# Patient Record
Sex: MALE | Race: WHITE | NOT HISPANIC OR LATINO
[De-identification: names, ages, dates, MRNs, and addresses within clinical notes are randomized per-mention and may not be internally consistent; named-entity substitution may affect disease eponyms.]

---

## 2018-11-12 ENCOUNTER — APPOINTMENT (OUTPATIENT)
Dept: COLORECTAL SURGERY | Facility: CLINIC | Age: 48
End: 2018-11-12
Payer: COMMERCIAL

## 2018-11-12 VITALS
TEMPERATURE: 98 F | SYSTOLIC BLOOD PRESSURE: 126 MMHG | BODY MASS INDEX: 27.11 KG/M2 | WEIGHT: 183 LBS | HEIGHT: 69 IN | DIASTOLIC BLOOD PRESSURE: 73 MMHG | HEART RATE: 65 BPM

## 2018-11-12 DIAGNOSIS — Z78.9 OTHER SPECIFIED HEALTH STATUS: ICD-10-CM

## 2018-11-12 DIAGNOSIS — Z83.71 FAMILY HISTORY OF COLONIC POLYPS: ICD-10-CM

## 2018-11-12 DIAGNOSIS — A74.9 CHLAMYDIAL INFECTION, UNSPECIFIED: ICD-10-CM

## 2018-11-12 DIAGNOSIS — Z86.19 PERSONAL HISTORY OF OTHER INFECTIOUS AND PARASITIC DISEASES: ICD-10-CM

## 2018-11-12 DIAGNOSIS — J45.909 UNSPECIFIED ASTHMA, UNCOMPLICATED: ICD-10-CM

## 2018-11-12 PROCEDURE — 46922 EXCISION OF ANAL LESION(S): CPT

## 2018-11-12 PROCEDURE — 99202 OFFICE O/P NEW SF 15 MIN: CPT | Mod: 25

## 2018-11-12 RX ORDER — BUDESONIDE AND FORMOTEROL FUMARATE DIHYDRATE 160; 4.5 UG/1; UG/1
160-4.5 AEROSOL RESPIRATORY (INHALATION)
Refills: 0 | Status: ACTIVE | COMMUNITY

## 2018-11-12 RX ORDER — EMTRICITABINE AND TENOFOVIR DISOPROXIL FUMARATE 100; 150 MG/1; MG/1
100-150 TABLET, FILM COATED ORAL
Refills: 0 | Status: ACTIVE | COMMUNITY

## 2019-05-29 ENCOUNTER — APPOINTMENT (OUTPATIENT)
Dept: COLORECTAL SURGERY | Facility: CLINIC | Age: 49
End: 2019-05-29
Payer: COMMERCIAL

## 2019-05-29 VITALS
DIASTOLIC BLOOD PRESSURE: 79 MMHG | WEIGHT: 182 LBS | HEART RATE: 76 BPM | HEIGHT: 69 IN | BODY MASS INDEX: 26.96 KG/M2 | SYSTOLIC BLOOD PRESSURE: 139 MMHG | TEMPERATURE: 97.8 F

## 2019-05-29 DIAGNOSIS — K60.2 ANAL FISSURE, UNSPECIFIED: ICD-10-CM

## 2019-05-29 DIAGNOSIS — Z00.00 ENCOUNTER FOR GENERAL ADULT MEDICAL EXAMINATION W/OUT ABNORMAL FINDINGS: ICD-10-CM

## 2019-05-29 PROCEDURE — 99213 OFFICE O/P EST LOW 20 MIN: CPT | Mod: 25

## 2019-05-29 PROCEDURE — 46600 DIAGNOSTIC ANOSCOPY SPX: CPT

## 2019-05-30 NOTE — ASSESSMENT
[FreeTextEntry1] : Exam findings and diagnosis were discussed at length with patient. 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

## 2019-05-30 NOTE — CONSULT LETTER
[Dear  ___] : Dear  [unfilled], [Consult Letter:] : I had the pleasure of evaluating your patient, [unfilled]. [( Thank you for referring [unfilled] for consultation for _____ )] : Thank you for referring [unfilled] for consultation for [unfilled] [Please see my note below.] : Please see my note below. [Consult Closing:] : Thank you very much for allowing me to participate in the care of this patient.  If you have any questions, please do not hesitate to contact me. [Sincerely,] : Sincerely, [FreeTextEntry2] : GEORGE CUEVA\par [address]\[place] [address] [FreeTextEntry3] : NHAN GAVIRIA MD

## 2019-05-30 NOTE — PHYSICAL EXAM
[FreeTextEntry1] : Medical assistant present for duration of physical examination\par \par Gen NAD, AOx3\par \par Anorectal Exam:\par Inspection anterior midline fissure, reactive small edematous tag that patient confirms is the area of concern prompting evaluation today\par STI swabs performed\par PEARL mildlly tender, no masses palpated, no blood on gloved finger, elevated tone\par Anoscopy anterior midline fissure, mild internal hemorrhoids\par \par

## 2019-05-30 NOTE — HISTORY OF PRESENT ILLNESS
[FreeTextEntry1] : 49 yo M presents for evaluation of possible wart\par \par Last seen by Dr. Mendez 11/2018, noted anal fissure and ext hemorrhoid and residual skin tags, s/p excision to left posterior\par \par Pt c/o burning after BMs x 1 month and admits to avoiding anal receptive sex due to discomfort\par Reports "bloody" looking tissue always out\par Occasional BPR w/ BMs noted on stool\par Denies itching or discharge\par Has not tried any OTC medications or creams\par Denies h/o anogenital warts\par \par Seen by Dr. Ontiveros (GI) 5/21/19 for c/o BM frequency, completed ?flex sigmoidoscopy in office, normal as per patient. Started on Canasa once nightly w/ improvement in internal irritation. Pt reports external symptoms were not addressed at this time.\par \par BH: 2-3 times daily, formed. Denies straining or complaints\par Reports adequate dietary fiber intake, drinks approx 5-6 cups water\par (+) anal receptive sex, HIV (-) on PrEP. Denies new sexual partners, reports monogamous relationship plus additional known partners\par \par Had a colonoscopy done 9/16 with no abnormal findings per patient, h/o colorectal cancer and polyps on maternal side of family\par No ASA  in last 7 days, took ibuprofen a few days ago\par Never received Gardasil vaccine \par

## 2019-05-31 DIAGNOSIS — A74.9 CHLAMYDIAL INFECTION, UNSPECIFIED: ICD-10-CM

## 2019-05-31 LAB
C TRACH RRNA SPEC QL NAA+PROBE: DETECTED
N GONORRHOEA RRNA SPEC QL NAA+PROBE: NOT DETECTED
SOURCE AMPLIFICATION: NORMAL

## 2019-06-03 RX ORDER — CEFTRIAXONE 250 MG/1
250 INJECTION, POWDER, FOR SOLUTION INTRAMUSCULAR; INTRAVENOUS
Qty: 1 | Refills: 0 | Status: COMPLETED | OUTPATIENT
Start: 2019-06-03

## 2019-06-06 LAB — HHV SPEC CULT: NORMAL

## 2020-09-03 ENCOUNTER — TRANSCRIPTION ENCOUNTER (OUTPATIENT)
Age: 50
End: 2020-09-03

## 2020-10-09 ENCOUNTER — TRANSCRIPTION ENCOUNTER (OUTPATIENT)
Age: 50
End: 2020-10-09

## 2021-01-14 ENCOUNTER — APPOINTMENT (OUTPATIENT)
Dept: COLORECTAL SURGERY | Facility: CLINIC | Age: 51
End: 2021-01-14
Payer: COMMERCIAL

## 2021-01-14 VITALS
TEMPERATURE: 97.3 F | BODY MASS INDEX: 28.58 KG/M2 | WEIGHT: 193 LBS | HEIGHT: 69 IN | HEART RATE: 66 BPM | SYSTOLIC BLOOD PRESSURE: 116 MMHG | DIASTOLIC BLOOD PRESSURE: 74 MMHG

## 2021-01-14 DIAGNOSIS — K64.8 OTHER HEMORRHOIDS: ICD-10-CM

## 2021-01-14 PROCEDURE — 46600 DIAGNOSTIC ANOSCOPY SPX: CPT

## 2021-01-14 PROCEDURE — 99072 ADDL SUPL MATRL&STAF TM PHE: CPT

## 2021-01-14 RX ORDER — FLUOROURACIL 50 MG/G
5 CREAM TOPICAL
Qty: 40 | Refills: 0 | Status: DISCONTINUED | COMMUNITY
Start: 2019-03-26 | End: 2021-01-14

## 2021-01-14 RX ORDER — AZITHROMYCIN 500 MG/1
500 TABLET, FILM COATED ORAL
Qty: 2 | Refills: 0 | Status: DISCONTINUED | COMMUNITY
Start: 2019-05-31 | End: 2021-01-14

## 2021-01-14 RX ORDER — HYDROCORTISONE 25 MG/G
2.5 CREAM TOPICAL
Qty: 1 | Refills: 2 | Status: ACTIVE | COMMUNITY
Start: 2021-01-14 | End: 1900-01-01

## 2021-01-14 RX ORDER — SINECATECHINS 150 MG/G
15 OINTMENT TOPICAL
Qty: 30 | Refills: 0 | Status: DISCONTINUED | COMMUNITY
Start: 2018-11-28 | End: 2021-01-14

## 2021-01-14 RX ORDER — MESALAMINE 1000 MG/1
1000 SUPPOSITORY RECTAL
Qty: 30 | Refills: 0 | Status: DISCONTINUED | COMMUNITY
Start: 2019-05-21 | End: 2021-01-14

## 2021-01-14 RX ORDER — EMTRICITABINE AND TENOFOVIR DISOPROXIL FUMARATE 200; 300 MG/1; MG/1
200-300 TABLET, FILM COATED ORAL
Qty: 30 | Refills: 0 | Status: DISCONTINUED | COMMUNITY
Start: 2018-02-28 | End: 2021-01-14

## 2021-01-14 NOTE — HISTORY OF PRESENT ILLNESS
[FreeTextEntry1] : 51 y/o M presents for f/u anal fissure\par Last seen in the office on 5/29/19.  Anterior midline fissure and reactive small edematous tag noted on exam. He was advised to begin using Diltiazem ointment TID. STD testing completed during last visit which was positive for Chlamydia. He was treated with 1 G Azithromycin and Ceftriaxone IM\par \par Reports intermittent recurrence of fissure since previous visit. Reports occasional discomfort and sensation that the are is "raw" after BM's or sexual activity that can last several weeks when it occurs\par Has applied Diltiazem ointment in the past fort 2-3 weeks at a time with temporary improvement in symptoms\par Denies pain, itching, bleeding currently, drainage, fever, chills\par BH:2-3 times daily\par Denies constipation, diarrhea or straining\par Admits to limited dietary fiber intake. Drinking 3 cups of water daily \par No use of stool softeners, fiber supplements or laxatives\par MSM, (+) anal receptive sex\par HIV (-), on PrEP\par Last colonoscopy completed 2016, no abnormal findings per patient. Flexible sigmoidoscopy completed with Dr. Ontiveros, GI, in the office on 5/21/19 for frequent BM's. Following study he was started on Canasa \par No ASA/NSAIDs last 7 days

## 2021-01-14 NOTE — PHYSICAL EXAM
[FreeTextEntry1] : Medical assistant present for duration of physical examination\par \par Gen no acute distress, alert and oriented\par Psych calm, pleasant demeanor, responding appropriately to questions\par Nonlabored breathing\par Ambulating without assistance\par Skin normal color and pigment, no visible lesions or rashes\par \par Abdomen\par \par Anorectal Exam:\par Inspection no erythema, induration or fluctuance, no skin excoriation, no fissure, no external hemorrhoids or anal tags\par PEARL nontender, no masses palpated, no blood on gloved finger\par \par Procedure: Anoscopy\par \par Pre procedure Diagnosis: perianal discomfort\par Post procedure Diagnosis: internal hemorrhoids\par Anesthesia: none\par Estimated blood loss: none\par Specimen: none\par Complications: none\par \par Consent obtained. Anoscopy was performed by passing a lighted anoscope with lubricant jelly into the anal canal and the entire anal mucosal surface was inspected. Findings included no fissure, mildly inflamed internal hemorrhoids, no visible masses or lesions\par \par Patient tolerated examination and procedure well.\par \par \par

## 2021-01-14 NOTE — ASSESSMENT
[FreeTextEntry1] : Exam findings and diagnosis were discussed at length with patient. \par \par Previously noted fissure has resolved.\par \par Internal hemorrhoids noted on anoscopy.\par \par Recommendations including increased fiber intake, adequate daily hydration, stool softeners as needed, and sitz baths as needed and after bowel movements were discussed.\par Medical management, such as calmol-4 suppositories and  hydrocortisone cream, was discussed.\par \par Continue supportive care.\par F/u if symptoms worsen despite medical management or if new symptoms arise.\par \par All questions answered, patient expressed understanding and is agreeable to this plan.\par

## 2022-08-16 ENCOUNTER — APPOINTMENT (OUTPATIENT)
Dept: RADIOLOGY | Facility: HOSPITAL | Age: 52
End: 2022-08-16

## 2023-01-04 ENCOUNTER — NON-APPOINTMENT (OUTPATIENT)
Age: 53
End: 2023-01-04

## 2023-01-26 ENCOUNTER — NON-APPOINTMENT (OUTPATIENT)
Age: 53
End: 2023-01-26

## 2023-01-26 ENCOUNTER — APPOINTMENT (OUTPATIENT)
Dept: COLORECTAL SURGERY | Facility: CLINIC | Age: 53
End: 2023-01-26
Payer: COMMERCIAL

## 2023-01-26 VITALS
SYSTOLIC BLOOD PRESSURE: 111 MMHG | HEIGHT: 69 IN | DIASTOLIC BLOOD PRESSURE: 68 MMHG | TEMPERATURE: 97.88 F | BODY MASS INDEX: 26.66 KG/M2 | WEIGHT: 180 LBS | HEART RATE: 75 BPM

## 2023-01-26 DIAGNOSIS — Z12.11 ENCOUNTER FOR SCREENING FOR MALIGNANT NEOPLASM OF COLON: ICD-10-CM

## 2023-01-26 DIAGNOSIS — Z01.818 ENCOUNTER FOR OTHER PREPROCEDURAL EXAMINATION: ICD-10-CM

## 2023-01-26 PROCEDURE — 99212 OFFICE O/P EST SF 10 MIN: CPT

## 2023-01-26 NOTE — HISTORY OF PRESENT ILLNESS
[FreeTextEntry1] : 51 y/o M presents for colonoscopy consultation, h/o anal fissure.\par \par MSM, (+) anal receptive sex\par HIV (-), on PrEP\par \par Last colonoscopy completed 2016, no abnormal findings per patient, unsure of Dr. name, was told to repeat in 5 years. Flexible sigmoidoscopy completed with Dr. Ontiveros GI, in the office on 5/21/19 for frequent BM's. Following study he was started on Canasa. \par [facility] CRC- Grandfather on Mothers side-unsure of age diagnosed. \par \par Last seen in the office 1/14/21, intermittent recurrence of fissure. Exam including anoscopy revealed, no fissure, mildly inflamed internal hemorrhoids, no visible masses or lesions. supportive measures reviewed including use of stool softeners, increase daily fiber intake and adequate hydration. \par \par Patient is here today for a follow up and to discuss colonoscopy. C/o intermittent bleeding with bowel movement. Denies fever, unintentional weight loss, abdominal pain, n/v/c/d, or change in bowel habits, upper GI symptoms\par \par BH: twice a day, muddy \par Adequate dietary fiber and water intake\par Denies fiber supplements or stool softeners\par \par \par

## 2023-01-26 NOTE — ASSESSMENT
[FreeTextEntry1] : Patient offered colonoscopy date with Dr. Avalos. however patient opts to proceed with a provider who has morning availability. Patient opted to leave without scheduling, states he has another appointment to go to. He was given a  list of GI providers to schedule with at his convenience. \par \par Chart review completed by MARYJO Fatima and MARYJO Clemente. Pt seen by MARYJO Clemente and I agree with above recommendations.